# Patient Record
Sex: MALE | Race: WHITE | NOT HISPANIC OR LATINO
[De-identification: names, ages, dates, MRNs, and addresses within clinical notes are randomized per-mention and may not be internally consistent; named-entity substitution may affect disease eponyms.]

---

## 2017-07-15 PROBLEM — Z00.00 ENCOUNTER FOR PREVENTIVE HEALTH EXAMINATION: Status: ACTIVE | Noted: 2017-07-15

## 2018-07-16 ENCOUNTER — APPOINTMENT (OUTPATIENT)
Dept: INTERNAL MEDICINE | Facility: CLINIC | Age: 28
End: 2018-07-16

## 2020-08-15 ENCOUNTER — EMERGENCY (EMERGENCY)
Facility: HOSPITAL | Age: 30
LOS: 1 days | Discharge: HOME | End: 2020-08-15
Attending: EMERGENCY MEDICINE | Admitting: EMERGENCY MEDICINE
Payer: COMMERCIAL

## 2020-08-15 VITALS
SYSTOLIC BLOOD PRESSURE: 130 MMHG | HEART RATE: 77 BPM | TEMPERATURE: 98 F | RESPIRATION RATE: 18 BRPM | WEIGHT: 128.09 LBS | OXYGEN SATURATION: 100 % | DIASTOLIC BLOOD PRESSURE: 82 MMHG | HEIGHT: 65 IN

## 2020-08-15 DIAGNOSIS — Z98.89 OTHER SPECIFIED POSTPROCEDURAL STATES: Chronic | ICD-10-CM

## 2020-08-15 LAB
ALBUMIN SERPL ELPH-MCNC: 4.6 G/DL — SIGNIFICANT CHANGE UP (ref 3.3–5)
ALP SERPL-CCNC: 89 U/L — SIGNIFICANT CHANGE UP (ref 40–120)
ALT FLD-CCNC: 17 U/L — SIGNIFICANT CHANGE UP (ref 10–45)
ANION GAP SERPL CALC-SCNC: 11 MMOL/L — SIGNIFICANT CHANGE UP (ref 5–17)
APPEARANCE UR: CLEAR — SIGNIFICANT CHANGE UP
AST SERPL-CCNC: 16 U/L — SIGNIFICANT CHANGE UP (ref 10–40)
BASOPHILS # BLD AUTO: 0.04 K/UL — SIGNIFICANT CHANGE UP (ref 0–0.2)
BASOPHILS NFR BLD AUTO: 0.8 % — SIGNIFICANT CHANGE UP (ref 0–2)
BILIRUB SERPL-MCNC: 0.8 MG/DL — SIGNIFICANT CHANGE UP (ref 0.2–1.2)
BILIRUB UR-MCNC: NEGATIVE — SIGNIFICANT CHANGE UP
BUN SERPL-MCNC: 15 MG/DL — SIGNIFICANT CHANGE UP (ref 7–23)
CALCIUM SERPL-MCNC: 9.3 MG/DL — SIGNIFICANT CHANGE UP (ref 8.4–10.5)
CHLORIDE SERPL-SCNC: 102 MMOL/L — SIGNIFICANT CHANGE UP (ref 96–108)
CO2 SERPL-SCNC: 27 MMOL/L — SIGNIFICANT CHANGE UP (ref 22–31)
COLOR SPEC: YELLOW — SIGNIFICANT CHANGE UP
CREAT SERPL-MCNC: 0.94 MG/DL — SIGNIFICANT CHANGE UP (ref 0.5–1.3)
DIFF PNL FLD: NEGATIVE — SIGNIFICANT CHANGE UP
EOSINOPHIL # BLD AUTO: 0.04 K/UL — SIGNIFICANT CHANGE UP (ref 0–0.5)
EOSINOPHIL NFR BLD AUTO: 0.8 % — SIGNIFICANT CHANGE UP (ref 0–6)
GLUCOSE SERPL-MCNC: 101 MG/DL — HIGH (ref 70–99)
GLUCOSE UR QL: NEGATIVE — SIGNIFICANT CHANGE UP
HCT VFR BLD CALC: 43.1 % — SIGNIFICANT CHANGE UP (ref 39–50)
HGB BLD-MCNC: 14.9 G/DL — SIGNIFICANT CHANGE UP (ref 13–17)
IMM GRANULOCYTES NFR BLD AUTO: 0.4 % — SIGNIFICANT CHANGE UP (ref 0–1.5)
KETONES UR-MCNC: NEGATIVE — SIGNIFICANT CHANGE UP
LEUKOCYTE ESTERASE UR-ACNC: NEGATIVE — SIGNIFICANT CHANGE UP
LIDOCAIN IGE QN: 25 U/L — SIGNIFICANT CHANGE UP (ref 7–60)
LYMPHOCYTES # BLD AUTO: 1.81 K/UL — SIGNIFICANT CHANGE UP (ref 1–3.3)
LYMPHOCYTES # BLD AUTO: 35.7 % — SIGNIFICANT CHANGE UP (ref 13–44)
MCHC RBC-ENTMCNC: 32 PG — SIGNIFICANT CHANGE UP (ref 27–34)
MCHC RBC-ENTMCNC: 34.6 GM/DL — SIGNIFICANT CHANGE UP (ref 32–36)
MCV RBC AUTO: 92.5 FL — SIGNIFICANT CHANGE UP (ref 80–100)
MONOCYTES # BLD AUTO: 0.38 K/UL — SIGNIFICANT CHANGE UP (ref 0–0.9)
MONOCYTES NFR BLD AUTO: 7.5 % — SIGNIFICANT CHANGE UP (ref 2–14)
NEUTROPHILS # BLD AUTO: 2.78 K/UL — SIGNIFICANT CHANGE UP (ref 1.8–7.4)
NEUTROPHILS NFR BLD AUTO: 54.8 % — SIGNIFICANT CHANGE UP (ref 43–77)
NITRITE UR-MCNC: NEGATIVE — SIGNIFICANT CHANGE UP
NRBC # BLD: 0 /100 WBCS — SIGNIFICANT CHANGE UP (ref 0–0)
PH UR: 6.5 — SIGNIFICANT CHANGE UP (ref 5–8)
PLATELET # BLD AUTO: 142 K/UL — LOW (ref 150–400)
POTASSIUM SERPL-MCNC: 4.2 MMOL/L — SIGNIFICANT CHANGE UP (ref 3.5–5.3)
POTASSIUM SERPL-SCNC: 4.2 MMOL/L — SIGNIFICANT CHANGE UP (ref 3.5–5.3)
PROT SERPL-MCNC: 7 G/DL — SIGNIFICANT CHANGE UP (ref 6–8.3)
PROT UR-MCNC: NEGATIVE MG/DL — SIGNIFICANT CHANGE UP
RBC # BLD: 4.66 M/UL — SIGNIFICANT CHANGE UP (ref 4.2–5.8)
RBC # FLD: 12.3 % — SIGNIFICANT CHANGE UP (ref 10.3–14.5)
SODIUM SERPL-SCNC: 140 MMOL/L — SIGNIFICANT CHANGE UP (ref 135–145)
SP GR SPEC: 1.01 — SIGNIFICANT CHANGE UP (ref 1–1.03)
UROBILINOGEN FLD QL: 0.2 E.U./DL — SIGNIFICANT CHANGE UP
WBC # BLD: 5.07 K/UL — SIGNIFICANT CHANGE UP (ref 3.8–10.5)
WBC # FLD AUTO: 5.07 K/UL — SIGNIFICANT CHANGE UP (ref 3.8–10.5)

## 2020-08-15 PROCEDURE — 99284 EMERGENCY DEPT VISIT MOD MDM: CPT | Mod: 25

## 2020-08-15 PROCEDURE — 96374 THER/PROPH/DIAG INJ IV PUSH: CPT

## 2020-08-15 PROCEDURE — 80053 COMPREHEN METABOLIC PANEL: CPT

## 2020-08-15 PROCEDURE — 71046 X-RAY EXAM CHEST 2 VIEWS: CPT

## 2020-08-15 PROCEDURE — 83690 ASSAY OF LIPASE: CPT

## 2020-08-15 PROCEDURE — 36415 COLL VENOUS BLD VENIPUNCTURE: CPT

## 2020-08-15 PROCEDURE — 85025 COMPLETE CBC W/AUTO DIFF WBC: CPT

## 2020-08-15 PROCEDURE — 74019 RADEX ABDOMEN 2 VIEWS: CPT | Mod: 26

## 2020-08-15 PROCEDURE — 81003 URINALYSIS AUTO W/O SCOPE: CPT

## 2020-08-15 PROCEDURE — 71046 X-RAY EXAM CHEST 2 VIEWS: CPT | Mod: 26

## 2020-08-15 PROCEDURE — 74019 RADEX ABDOMEN 2 VIEWS: CPT

## 2020-08-15 RX ORDER — FAMOTIDINE 10 MG/ML
1 INJECTION INTRAVENOUS
Qty: 30 | Refills: 0
Start: 2020-08-15 | End: 2020-09-13

## 2020-08-15 RX ORDER — SODIUM CHLORIDE 9 MG/ML
1000 INJECTION INTRAMUSCULAR; INTRAVENOUS; SUBCUTANEOUS ONCE
Refills: 0 | Status: COMPLETED | OUTPATIENT
Start: 2020-08-15 | End: 2020-08-15

## 2020-08-15 RX ORDER — FAMOTIDINE 10 MG/ML
20 INJECTION INTRAVENOUS ONCE
Refills: 0 | Status: COMPLETED | OUTPATIENT
Start: 2020-08-15 | End: 2020-08-15

## 2020-08-15 RX ORDER — LIDOCAINE 4 G/100G
10 CREAM TOPICAL ONCE
Refills: 0 | Status: COMPLETED | OUTPATIENT
Start: 2020-08-15 | End: 2020-08-15

## 2020-08-15 RX ADMIN — SODIUM CHLORIDE 1000 MILLILITER(S): 9 INJECTION INTRAMUSCULAR; INTRAVENOUS; SUBCUTANEOUS at 11:27

## 2020-08-15 RX ADMIN — Medication 30 MILLILITER(S): at 11:27

## 2020-08-15 RX ADMIN — FAMOTIDINE 20 MILLIGRAM(S): 10 INJECTION INTRAVENOUS at 11:27

## 2020-08-15 RX ADMIN — LIDOCAINE 10 MILLILITER(S): 4 CREAM TOPICAL at 11:27

## 2020-08-15 NOTE — ED PROVIDER NOTE - CLINICAL SUMMARY MEDICAL DECISION MAKING FREE TEXT BOX
This is a pleasant 29 year old male pmhx hematoma in the mesentery of sigmoid requiring emergent sigmoid colectomy and hematoma evacuation presenting to the ed with epigastric abdominal pain. began yesterday. recently prescribed ketorolac and prednisone for left shoulder pain. PE patient appears well, non-toxic. discomfort to palpation epigastric region. abdomen is otherwise soft, non-tender. plan for labs meds and xr. spoke with pt's PMD, Dr. Cat ([phone]) to discuss case. This is a pleasant 29 year old male pmhx hematoma in the mesentery of sigmoid requiring emergent sigmoid colectomy and hematoma evacuation presenting to the ed with epigastric abdominal pain. began yesterday. recently prescribed ketorolac and prednisone for left shoulder pain. PE patient appears well, non-toxic. discomfort to palpation epigastric region. abdomen is otherwise soft, non-tender. plan for labs meds and xr. spoke with pt's PMD, Dr. Cat ([phone]) to discuss case. believe symptoms most likely 2/2 medication usage causing irritation to stomach. plan for continuation of pepcid and certain food avoidance, discussed with patietn and father. follow up with PMD/GI. pt agreeable to plan. ED evaluation and management discussed with the patient and family (if available) in detail.  Close PMD follow up encouraged.  Strict ED return instructions discussed in detail and patient given the opportunity to ask any questions about their discharge diagnosis and instructions. Patient verbalized understanding. Patient is agreeable to plan.

## 2020-08-15 NOTE — ED ADULT TRIAGE NOTE - CHIEF COMPLAINT QUOTE
pt arriving for c/c "Kizzy had abdominal pain for 2 days after receiving a steroid shot for shoulder pain". 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 Muniz for CT abd, per pt.

## 2020-08-15 NOTE — ED PROVIDER NOTE - PHYSICAL EXAMINATION
General: Patient is well developed and well nourised. Patient is alert and oriented to person, place and date. Patient is laying comfortably in stretcher and appears in no acute distress.  HEENT: Head is normocephalic and atraumatic. Pupils are equal, round and reactive to light and accommodation. Extraocular movements intact. No evidence of nystagmus, conjunctival injection, or scleral icterus. External ears symmetric and non-tender without evidence of discharge. Ear canals are clear without evidence of edema or erythema. Cone of light evident on tympanic membrade without evidence of erythema, retraction or bulge. No hemotympanum present bilaterally.  Nose is symmetric, non-tender, patent without evidence of discharge. Teeth in good repair. Uvula midline. Pharynx without erythema, edema, tonsillar enlargement or exudates.   Neck: Supple and nontender, with no evidence of lymphadenopathy. No cervical spinal tenderness. Full range of motion.  Heart: Regular rate and rhythm. No murmurs, rubs or gallops.   Lungs: Clear to auscultation bilaterally with equal chest expansion. No note of wheezes, rhonchi, rales. Equal chest expansion. No note of retractions.  Abdomen: ttp epigastric region. Bowel sounds present in all four quadrants. Soft, non-tender, non-distended without signs of masses, rebound or guarding. No note of hepatosplenomegaly. No CVA tenderness bilaterally. Negative Yeh sign. No pain present over McBurney's point.  Musculoskeletal: No edema, erythema, ecchymosis, atrophy or deformity. Full range of motion in all four extremities.  No clubbing or cyanosis. No point tenderness to palpation.   Neuro: Cranial nerves intact. GCS 15. Moving all extremities without discomfort. Sensation intact. Gait steady  Skin: Warm, dry and intact without evidence of rashes, bruising, pallor, jaundice or cyanosis.   Psych: Mood and affect appropriate.

## 2020-08-15 NOTE — ED PROVIDER NOTE - NSFOLLOWUPINSTRUCTIONS_ED_ALL_ED_FT
please continue to take pepcid 20mg once a day    I recommend avoiding taking prednisone and ketorolac together. I would switch to tyleno and l if possible for your shoulder pain    please avoid alcohol, spicy and acidic foods    please follow up with Dr. Cat or gastroenterologist    please come back for any worsening of symptoms    Gastritis, Adult  Gastritis is inflammation of the stomach. There are two kinds of gastritis:  Acute gastritis. This kind develops suddenly.Chronic gastritis. This kind is much more common and lasts for a long time.Gastritis happens when the lining of the stomach becomes weak or gets damaged. Without treatment, gastritis can lead to stomach bleeding and ulcers.  What are the causes?  This condition may be caused by:  An infection.Drinking too much alcohol.Certain medicines. These include steroids, antibiotics, and some over-the-counter medicines, such as aspirin or ibuprofen.Having too much acid in the stomach.A disease of the intestines or stomach.Stress.An allergic reaction.Crohn's disease.Some cancer treatments (radiation).Sometimes the cause of this condition is not known.  What are the signs or symptoms?  Symptoms of this condition include:  Pain or a burning sensation in the upper abdomen.Nausea.Vomiting.An uncomfortable feeling of fullness after eating.Weight loss.Bad breath.Blood in your vomit or stools.In some cases, there are no symptoms.  How is this diagnosed?  This condition may be diagnosed with:  Your medical history and a description of your symptoms.A physical exam.Tests. These can include:  Blood tests.Stool tests.A test in which a thin, flexible instrument with a light and a camera is passed down the esophagus and into the stomach (upper endoscopy).A test in which a sample of tissue is taken for testing (biopsy).How is this treated?  This condition may be treated with medicines. The medicines that are used vary depending on the cause of the gastritis:  If the condition is caused by a bacterial infection, you may be given antibiotic medicines.If the condition is caused by too much acid in the stomach, you may be given medicines called H2 blockers, proton pump inhibitors, or antacids.Treatment may also involve stopping the use of certain medicines, such as aspirin, ibuprofen, or other NSAIDs.  Follow these instructions at home:  Medicines     Take over-the-counter and prescription medicines only as told by your health care provider.If you were prescribed an antibiotic medicine, take it as told by your health care provider. Do not stop taking the antibiotic even if you start to feel better.Eating and drinking        Eat small, frequent meals instead of large meals.Avoid foods and drinks that make your symptoms worse.Drink enough fluid to keep your urine pale yellow.Alcohol use     Do not drink alcohol if:  Your health care provider tells you not to drink.You are pregnant, may be pregnant, or are planning to become pregnant.If you drink alcohol:  Limit your use to:  0–1 drink a day for women.0–2 drinks a day for men.Be aware of how much alcohol is in your drink. In the [place] one drink equals one 12 oz bottle of beer (355 mL), one 5 oz glass of wine (148 mL), or one 1½ oz glass of hard liquor (44 mL).General instructions     Talk with your health care provider about ways to manage stress, such as getting regular exercise or practicing deep breathing, meditation, or yoga.Do not use any products that contain nicotine or tobacco, such as cigarettes and e-cigarettes. If you need help quitting, ask your health care provider.Keep all follow-up visits as told by your health care provider. This is important.Contact a health care provider if:  Your symptoms get worse.Your symptoms return after treatment.Get help right away if:  You vomit blood or material that looks like coffee grounds.You have black or dark red stools.You are unable to keep fluids down.Your abdominal pain gets worse.You have a fever.You do not feel better after one week.Summary  Gastritis is inflammation of the lining of the stomach that can occur suddenly (acute) or develop slowly over time (chronic).This condition is diagnosed with a medical history, a physical exam, or tests.This condition may be treated with medicines to treat infection or medicines to reduce the amount of acid in your stomach.Follow your health care provider's instructions about taking medicines, making changes to your diet, and knowing when to call for help.This information is not intended to replace advice given to you by your health care provider. Make sure you discuss any questions you have with your health care provider.    Document Released: 12/12/2002 Document Revised: 05/07/2019 Document Reviewed: 05/07/2019  [email or web address] Patient Education © 2020 [employer]

## 2020-08-15 NOTE — ED PROVIDER NOTE - DIAGNOSTIC INTERPRETATION
ER PA: Nai Avendaño  CHEST XRAY INTERPRETATION: lungs clear, heart shadow normal, bony structures intact    ER Physician Assistant: abdominal xray  INTERPRETATION: no acute fracture; no soft tissue swelling noted; normal bony alignment.

## 2020-08-15 NOTE — ED PROVIDER NOTE - OBJECTIVE STATEMENT
pshx sigmoidectomy (2/16 for hematoma) presenting to the ed with lower abdominal pain. pshx sigmoidectomy (2/16 for hematoma) presenting to the ed with abdominal pain. states that it began yesterday. states that on Thursday, he was working out and injured  his left shoulder. states that he went to urgent care and received a "shot of pain medication and steroid". states that he was discharge with 2 rx, ketorolac and prednisone. states that pain began after taking medication. associated abdominal bloating. no fevers chills chest pain palpitations cough sob nausea vomiting or diarrhea. states that he had 3 small bm this morning "that floated to the top of the water". no blood or black tarry stools. was not able to take medication for stomach discomfort.

## 2020-08-15 NOTE — ED PROVIDER NOTE - ATTENDING CONTRIBUTION TO CARE
I discussed the plan of care of the patient directly with the PA and examined the patient while in the Emergency Department. I agree with the HPI and PE as documented by the PA.  Pt is a 30yo m, h/o sigmoidectomy 2/2 hemorrhage at age 25, who p/w upper abd pain since yesterday. Pt was working out 2d ago, developed shoulder pain, seen at [facility] and given toradol, dc'd home on prednisone and toradol. Pain to epigastrium, worse w/ food. No fever/ chills, nausea, vomiting, bloody bm, melena, urinary sx's. WNWD m in nad. + s1, s2, rrr. Lungs cta b/l. Abd soft, mild epig tenderness only to deep palpation. Labs reviewed and wnl including wbc, hg/hc, lipase. CXR/AXR neg for free air, obstructive pattern. Pt with likely gastritis 2/2 recent nsaid/ steroid use. Pt given gi cocktail, pepcid with improvement of sx's. No indications for abd imaging at this time. ED evaluation and management discussed with the patient in detail.  Close PMD/ GI follow up encouraged.  Strict ED return instructions discussed in detail and patient given the opportunity to ask any questions about their discharge diagnosis and instructions. Patient verbalized understanding.

## 2020-08-15 NOTE — ED PROVIDER NOTE - PATIENT PORTAL LINK FT
You can access the FollowMyHealth Patient Portal offered by [facility] by registering at the following website: [email or web address]. By joining [employer]’s FollowMyHealth portal, you will also be able to view your health information using other applications (apps) compatible with our system.

## 2020-08-19 DIAGNOSIS — R10.84 GENERALIZED ABDOMINAL PAIN: ICD-10-CM

## 2022-05-15 ENCOUNTER — EMERGENCY (EMERGENCY)
Facility: HOSPITAL | Age: 32
LOS: 1 days | Discharge: HOME | End: 2022-05-15
Admitting: EMERGENCY MEDICINE
Payer: COMMERCIAL

## 2022-05-15 ENCOUNTER — TRANSCRIPTION ENCOUNTER (OUTPATIENT)
Age: 32
End: 2022-05-15

## 2022-05-15 VITALS
SYSTOLIC BLOOD PRESSURE: 118 MMHG | HEART RATE: 102 BPM | RESPIRATION RATE: 20 BRPM | DIASTOLIC BLOOD PRESSURE: 69 MMHG | TEMPERATURE: 98 F | HEIGHT: 65 IN | WEIGHT: 149.91 LBS

## 2022-05-15 VITALS
HEART RATE: 88 BPM | DIASTOLIC BLOOD PRESSURE: 58 MMHG | OXYGEN SATURATION: 98 % | RESPIRATION RATE: 17 BRPM | TEMPERATURE: 98 F | SYSTOLIC BLOOD PRESSURE: 100 MMHG

## 2022-05-15 DIAGNOSIS — Z98.89 OTHER SPECIFIED POSTPROCEDURAL STATES: Chronic | ICD-10-CM

## 2022-05-15 LAB
ALBUMIN SERPL ELPH-MCNC: 4.3 G/DL — SIGNIFICANT CHANGE UP (ref 3.4–5)
ALP SERPL-CCNC: 106 U/L — SIGNIFICANT CHANGE UP (ref 40–120)
ALT FLD-CCNC: 26 U/L — SIGNIFICANT CHANGE UP (ref 12–42)
ANION GAP SERPL CALC-SCNC: 7 MMOL/L — LOW (ref 9–16)
APPEARANCE UR: CLEAR — SIGNIFICANT CHANGE UP
AST SERPL-CCNC: 17 U/L — SIGNIFICANT CHANGE UP (ref 15–37)
BASOPHILS # BLD AUTO: 0 K/UL — SIGNIFICANT CHANGE UP (ref 0–0.2)
BASOPHILS NFR BLD AUTO: 0 % — SIGNIFICANT CHANGE UP (ref 0–2)
BILIRUB SERPL-MCNC: 0.5 MG/DL — SIGNIFICANT CHANGE UP (ref 0.2–1.2)
BILIRUB UR-MCNC: NEGATIVE — SIGNIFICANT CHANGE UP
BUN SERPL-MCNC: 16 MG/DL — SIGNIFICANT CHANGE UP (ref 7–23)
CALCIUM SERPL-MCNC: 9.2 MG/DL — SIGNIFICANT CHANGE UP (ref 8.5–10.5)
CHLORIDE SERPL-SCNC: 104 MMOL/L — SIGNIFICANT CHANGE UP (ref 96–108)
CO2 SERPL-SCNC: 28 MMOL/L — SIGNIFICANT CHANGE UP (ref 22–31)
COLOR SPEC: YELLOW — SIGNIFICANT CHANGE UP
CREAT SERPL-MCNC: 1.01 MG/DL — SIGNIFICANT CHANGE UP (ref 0.5–1.3)
DIFF PNL FLD: NEGATIVE — SIGNIFICANT CHANGE UP
EGFR: 102 ML/MIN/1.73M2 — SIGNIFICANT CHANGE UP
EOSINOPHIL # BLD AUTO: 0 K/UL — SIGNIFICANT CHANGE UP (ref 0–0.5)
EOSINOPHIL NFR BLD AUTO: 0 % — SIGNIFICANT CHANGE UP (ref 0–6)
GLUCOSE SERPL-MCNC: 138 MG/DL — HIGH (ref 70–99)
GLUCOSE UR QL: NEGATIVE — SIGNIFICANT CHANGE UP
HCT VFR BLD CALC: 45.5 % — SIGNIFICANT CHANGE UP (ref 39–50)
HGB BLD-MCNC: 16.2 G/DL — SIGNIFICANT CHANGE UP (ref 13–17)
KETONES UR-MCNC: ABNORMAL MG/DL
LACTATE SERPL-SCNC: 1.4 MMOL/L — SIGNIFICANT CHANGE UP (ref 0.4–2)
LEUKOCYTE ESTERASE UR-ACNC: NEGATIVE — SIGNIFICANT CHANGE UP
LIDOCAIN IGE QN: 80 U/L — SIGNIFICANT CHANGE UP (ref 73–393)
LYMPHOCYTES # BLD AUTO: 0.5 K/UL — LOW (ref 1–3.3)
LYMPHOCYTES # BLD AUTO: 7 % — LOW (ref 13–44)
MAGNESIUM SERPL-MCNC: 1.4 MG/DL — LOW (ref 1.6–2.6)
MANUAL SMEAR VERIFICATION: SIGNIFICANT CHANGE UP
MCHC RBC-ENTMCNC: 31.6 PG — SIGNIFICANT CHANGE UP (ref 27–34)
MCHC RBC-ENTMCNC: 35.6 GM/DL — SIGNIFICANT CHANGE UP (ref 32–36)
MCV RBC AUTO: 88.9 FL — SIGNIFICANT CHANGE UP (ref 80–100)
MONOCYTES # BLD AUTO: 0.36 K/UL — SIGNIFICANT CHANGE UP (ref 0–0.9)
MONOCYTES NFR BLD AUTO: 5 % — SIGNIFICANT CHANGE UP (ref 2–14)
NEUTROPHILS # BLD AUTO: 5.9 K/UL — SIGNIFICANT CHANGE UP (ref 1.8–7.4)
NEUTROPHILS NFR BLD AUTO: 67 % — SIGNIFICANT CHANGE UP (ref 43–77)
NEUTS BAND # BLD: 16 % — HIGH (ref 0–8)
NITRITE UR-MCNC: NEGATIVE — SIGNIFICANT CHANGE UP
NRBC # BLD: 0 /100 — SIGNIFICANT CHANGE UP (ref 0–0)
NRBC # BLD: SIGNIFICANT CHANGE UP /100 WBCS (ref 0–0)
PH UR: 8.5 — HIGH (ref 5–8)
PLAT MORPH BLD: NORMAL — SIGNIFICANT CHANGE UP
PLATELET # BLD AUTO: 152 K/UL — SIGNIFICANT CHANGE UP (ref 150–400)
POTASSIUM SERPL-MCNC: 4.1 MMOL/L — SIGNIFICANT CHANGE UP (ref 3.5–5.3)
POTASSIUM SERPL-SCNC: 4.1 MMOL/L — SIGNIFICANT CHANGE UP (ref 3.5–5.3)
PROT SERPL-MCNC: 7.5 G/DL — SIGNIFICANT CHANGE UP (ref 6.4–8.2)
PROT UR-MCNC: NEGATIVE MG/DL — SIGNIFICANT CHANGE UP
RBC # BLD: 5.12 M/UL — SIGNIFICANT CHANGE UP (ref 4.2–5.8)
RBC # FLD: 11.6 % — SIGNIFICANT CHANGE UP (ref 10.3–14.5)
RBC BLD AUTO: NORMAL — SIGNIFICANT CHANGE UP
SODIUM SERPL-SCNC: 139 MMOL/L — SIGNIFICANT CHANGE UP (ref 132–145)
SP GR SPEC: 1.02 — SIGNIFICANT CHANGE UP (ref 1–1.03)
UROBILINOGEN FLD QL: 0.2 E.U./DL — SIGNIFICANT CHANGE UP
VARIANT LYMPHS # BLD: 5 % — SIGNIFICANT CHANGE UP (ref 0–6)
WBC # BLD: 7.11 K/UL — SIGNIFICANT CHANGE UP (ref 3.8–10.5)
WBC # FLD AUTO: 7.11 K/UL — SIGNIFICANT CHANGE UP (ref 3.8–10.5)

## 2022-05-15 PROCEDURE — 99285 EMERGENCY DEPT VISIT HI MDM: CPT

## 2022-05-15 PROCEDURE — 74176 CT ABD & PELVIS W/O CONTRAST: CPT | Mod: 26,59

## 2022-05-15 PROCEDURE — 74177 CT ABD & PELVIS W/CONTRAST: CPT | Mod: 26

## 2022-05-15 RX ORDER — MAGNESIUM SULFATE 500 MG/ML
1 VIAL (ML) INJECTION ONCE
Refills: 0 | Status: COMPLETED | OUTPATIENT
Start: 2022-05-15 | End: 2022-05-15

## 2022-05-15 RX ORDER — ONDANSETRON 8 MG/1
4 TABLET, FILM COATED ORAL ONCE
Refills: 0 | Status: COMPLETED | OUTPATIENT
Start: 2022-05-15 | End: 2022-05-15

## 2022-05-15 RX ORDER — METOCLOPRAMIDE HCL 10 MG
10 TABLET ORAL ONCE
Refills: 0 | Status: COMPLETED | OUTPATIENT
Start: 2022-05-15 | End: 2022-05-15

## 2022-05-15 RX ORDER — ONDANSETRON 8 MG/1
1 TABLET, FILM COATED ORAL
Qty: 12 | Refills: 0
Start: 2022-05-15

## 2022-05-15 RX ORDER — DIATRIZOATE MEGLUMINE 180 MG/ML
30 INJECTION, SOLUTION INTRAVESICAL ONCE
Refills: 0 | Status: COMPLETED | OUTPATIENT
Start: 2022-05-15 | End: 2022-05-15

## 2022-05-15 RX ORDER — MORPHINE SULFATE 50 MG/1
4 CAPSULE, EXTENDED RELEASE ORAL ONCE
Refills: 0 | Status: DISCONTINUED | OUTPATIENT
Start: 2022-05-15 | End: 2022-05-15

## 2022-05-15 RX ORDER — ACETAMINOPHEN 500 MG
650 TABLET ORAL ONCE
Refills: 0 | Status: COMPLETED | OUTPATIENT
Start: 2022-05-15 | End: 2022-05-15

## 2022-05-15 RX ORDER — FAMOTIDINE 10 MG/ML
1 INJECTION INTRAVENOUS
Qty: 28 | Refills: 0
Start: 2022-05-15 | End: 2022-06-11

## 2022-05-15 RX ORDER — SODIUM CHLORIDE 9 MG/ML
1000 INJECTION INTRAMUSCULAR; INTRAVENOUS; SUBCUTANEOUS ONCE
Refills: 0 | Status: COMPLETED | OUTPATIENT
Start: 2022-05-15 | End: 2022-05-15

## 2022-05-15 RX ORDER — ONDANSETRON 8 MG/1
1 TABLET, FILM COATED ORAL
Qty: 10 | Refills: 0
Start: 2022-05-15

## 2022-05-15 RX ADMIN — MORPHINE SULFATE 4 MILLIGRAM(S): 50 CAPSULE, EXTENDED RELEASE ORAL at 11:15

## 2022-05-15 RX ADMIN — Medication 10 MILLIGRAM(S): at 12:01

## 2022-05-15 RX ADMIN — Medication 100 GRAM(S): at 12:01

## 2022-05-15 RX ADMIN — SODIUM CHLORIDE 1000 MILLILITER(S): 9 INJECTION INTRAMUSCULAR; INTRAVENOUS; SUBCUTANEOUS at 11:15

## 2022-05-15 RX ADMIN — ONDANSETRON 4 MILLIGRAM(S): 8 TABLET, FILM COATED ORAL at 11:15

## 2022-05-15 RX ADMIN — ONDANSETRON 4 MILLIGRAM(S): 8 TABLET, FILM COATED ORAL at 12:02

## 2022-05-15 RX ADMIN — ONDANSETRON 4 MILLIGRAM(S): 8 TABLET, FILM COATED ORAL at 20:44

## 2022-05-15 RX ADMIN — Medication 650 MILLIGRAM(S): at 14:34

## 2022-05-15 RX ADMIN — DIATRIZOATE MEGLUMINE 30 MILLILITER(S): 180 INJECTION, SOLUTION INTRAVESICAL at 11:15

## 2022-05-15 NOTE — ED ADULT TRIAGE NOTE - NS ED TRIAGE AVPU SCALE
Health Maintenance Due   Topic Date Due   • Pneumococcal Vaccine 0-64 (1 of 3 - PCV13) 10/23/1960   • DTaP/Tdap/Td Vaccine (1 - Tdap) 01/15/2004       Patient is due for the topics as listed above. Patient would like to discuss with provider.            Alert-The patient is alert, awake and responds to voice. The patient is oriented to time, place, and person. The triage nurse is able to obtain subjective information.

## 2022-05-15 NOTE — ED PROVIDER NOTE - PHYSICAL EXAMINATION
CONSTITUTIONAL: appears uncomfortable due to pain. awake, alert.   	HEAD: Normocephalic; atraumatic.   	EYES:  conjunctiva and sclera clear  	ENT: normal nose; no rhinorrhea; normal pharynx with no erythema or lesions.   	NECK: Supple; non-tender;   	CARDIOVASCULAR: Normal S1, S2; no murmurs, rubs, or gallops. Regular rate and rhythm.   	RESPIRATORY: Breathing easily; breath sounds clear and equal bilaterally; no wheezes, rhonchi, or rales.  	GI: Soft; non-distended; non-tender  	EXT: KING x 4. ambulatory.   	SKIN: Normal for age and race; warm; dry; good turgor; no apparent lesions or rash.   	NEURO: A & O x 3; face symmetric; grossly unremarkable.   PSYCHOLOGICAL: The patient’s mood and manner are appropriate.

## 2022-05-15 NOTE — ED ADULT TRIAGE NOTE - CHIEF COMPLAINT QUOTE
c/o upper abdominal relieved with vomiting since 4am. now experiencing body aches. not able to tolerate PO. 6yrs ago portion of sigmoid colon removed pt states "Kizzy been in and out of hospital for blockage". States he had covid 5 weeks ago. c/o upper abdominal relieved with vomiting since 4am. now experiencing body aches and headache. not able to tolerate PO. PMH: 6yrs ago portion of sigmoid colon removed pt states "Kizzy been in and out of hospital for blockage". States he had covid 5 weeks ago.

## 2022-05-15 NOTE — ED PROVIDER NOTE - PATIENT PORTAL LINK FT
You can access the FollowMyHealth Patient Portal offered by  by registering at the following website: [email or web address]. By joining [employer]’s FollowMyHealth portal, you will also be able to view your health information using other applications (apps) compatible with our system.

## 2022-05-15 NOTE — ED PROVIDER NOTE - PROGRESS NOTE DETAILS
patient feeling better, tolerating po. pain resolved. no vomiting. most likely viral gastroenteritis, advised supportive care, hydration, return precautions discussed.

## 2022-05-15 NOTE — ED ADULT NURSE NOTE - CHIEF COMPLAINT QUOTE
c/o upper abdominal relieved with vomiting since 4am. now experiencing body aches and headache. not able to tolerate PO. PMH: 6yrs ago portion of sigmoid colon removed pt states "Kizzy been in and out of hospital for blockage". States he had covid 5 weeks ago.

## 2022-05-15 NOTE — ED PROVIDER NOTE - NSFOLLOWUPINSTRUCTIONS_ED_ALL_ED_FT
Drink plenty of fluids. Rest  Follow up with your doctor  Zofran as needed for nausea    Return for worsening pain, vomiting, fever or any concerns.

## 2022-05-15 NOTE — ED PROVIDER NOTE - OBJECTIVE STATEMENT
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 about 6-7 episodes of vomitus NBNB. no diarrhea, last BM this morning normal, passing flatus. no fever/chills. no cp/sob/back pain/syncope.

## 2022-05-17 DIAGNOSIS — R10.10 UPPER ABDOMINAL PAIN, UNSPECIFIED: ICD-10-CM

## 2022-05-17 DIAGNOSIS — R11.10 VOMITING, UNSPECIFIED: ICD-10-CM

## 2024-10-02 ENCOUNTER — NON-APPOINTMENT (OUTPATIENT)
Age: 34
End: 2024-10-02

## 2024-10-02 ENCOUNTER — APPOINTMENT (OUTPATIENT)
Dept: OPHTHALMOLOGY | Facility: CLINIC | Age: 34
End: 2024-10-02
Payer: COMMERCIAL

## 2024-10-02 PROCEDURE — 92002 INTRM OPH EXAM NEW PATIENT: CPT

## 2024-10-19 NOTE — ED ADULT NURSE NOTE - STOOL PATTERN
Subjective   Patient ID: Cristobal Santana is a 66 y.o. male. They present today with a chief complaint of Cough (2 weeks - dark green phlegm ), Insomnia (2 weeks ), and Headache (2 weeks ).    Patient disposition: Home    HISTORY OF PRESENT ILLNESS:    Older adult male presents for cough. He has had a cough for 2 weeks with occasional production of yellow mucus. He denies shortness of breath. He had 2 other urgent care visits for this and received a course of steroids, amoxicillin, benzonatate. He presents with his adult daughter frustrated at lack of resolution of cough. He thinks he was diagnosed with a sinus infxn. Denies facial pain or pressure. Denies f/c/s.        Past Medical History  Allergies as of 10/19/2024 - Reviewed 10/19/2024   Allergen Reaction Noted    Povidone-iodine Rash 07/11/2018       (Not in a hospital admission)       Past Medical History:   Diagnosis Date    Acute frontal sinusitis, unspecified 08/22/2018    Acute frontal sinusitis    Acute upper respiratory infection, unspecified 11/23/2015    Acute URI    Allergy status to unspecified drugs, medicaments and biological substances     History of seasonal allergies    Cough, unspecified 12/07/2015    Cough    Disorder of the skin and subcutaneous tissue, unspecified 11/23/2015    Changing skin lesion    Encounter for immunization 11/23/2015    Need for shingles vaccine    Encounter for immunization 11/23/2015    Need for hepatitis A and B vaccination    Encounter for screening for malignant neoplasm of prostate 10/04/2016    Screening PSA (prostate specific antigen)    Pain in left knee     Knee pain, left    Pain in right foot 10/26/2016    Intractable right heel pain    Personal history of other diseases of the respiratory system     History of extrinsic asthma    Personal history of other diseases of the respiratory system 11/23/2015    History of sinusitis       Past Surgical History:   Procedure Laterality Date    ADENOIDECTOMY   "07/22/2014    Adenoidectomy    KNEE SURGERY  11/04/2016    Knee Surgery    OTHER SURGICAL HISTORY  11/27/2019    Tongue surgery    OTHER SURGICAL HISTORY  04/05/2021    Cardiac catheterization with stent placement    OTHER SURGICAL HISTORY  12/02/2022    Tooth extraction    PROSTATE SURGERY  07/22/2014    Prostate Surgery    SHOULDER SURGERY  07/22/2014    Shoulder Surgery    SINUS SURGERY  07/22/2014    Sinus Surgery    TONSILLECTOMY  07/22/2014    Tonsillectomy    TOTAL KNEE ARTHROPLASTY  08/22/2018    Total Knee Replacement Left        reports that he has never smoked. He has been exposed to tobacco smoke. He has never used smokeless tobacco. He reports current alcohol use. He reports that he does not use drugs.    Review of Systems    Negative except as documented in the History of Present Illness.                             Objective    Vitals:    10/19/24 0929   BP: 126/80   Pulse: 76   Resp: 16   Temp: 36.3 °C (97.3 °F)   SpO2: 96%   Weight: 102 kg (225 lb)   Height: 1.778 m (5' 10\")     No LMP for male patient.      PHYSICAL EXAMINATION:    CONSTITUTIONAL: well-appearing, nontoxic         ENT:  Head and face are unremarkable and atraumatic. Mucous membranes moist.    * Oropharynx nl. Airway patent.    * No uvular deviation. No visible abscess.    * Lymphadenopathy absent.    * TMs nl bl.         LUNGS:  Coarse sounds diffusely wo w/r/r. Frequent dry coughing. No increased WOB.    CARDIOVASCULAR:   RRR, no m/r/g. Nl S1/S2.    ABDOMEN:  Nontender including left upper quadrant, nondistended, no acute abdomen.     MUSCULOSKELETAL: No obvious deformities. SHEIKH with equal strength. Gait normal.    SKIN:   Warm and dry with no rashes.    NEURO:  Normal baseline mental status.    PSYCH: Appropriate mood and affect.         ------------------------------------------         MDM: CXR interpreted by me independently demonstrated no PNA. Discussed acute bronchitis dx with the patient and explained that likely no " bacterial infxn and no indication for more abx at this time. Will fu with his PCP. Rx sent.        Procedures    Diagnostic study results (if any) were reviewed by Cruz Rushing PA-C.    No results found for this visit on 10/19/24.     Assessment/Plan   Allergies, medications, history, and pertinent labs/EKGs/Imaging reviewed by Cruz Rushing PA-C.     Orders and Diagnoses  There are no diagnoses linked to this encounter.    Medical Admin Record      Follow Up Instructions  No follow-ups on file.    Electronically signed by Cruz Rushing PA-C  9:32 AM     normal patient pattern/diarrhea

## 2025-01-22 NOTE — ED ADULT NURSE NOTE - NS ED NURSE RECORD ANOTHER VITAL SIGN
Called patient. She states it is for a genital herpes flare up. Her previous Gyne wanted her to take Valacyclovir 500mg daily but she did not want to do that. She states this dose is the typical dose she has received for years. If dosage needs to be changed, she will do whatever is recommended. She did ask for an additional refill to have on hand.        Yes